# Patient Record
Sex: MALE | Race: BLACK OR AFRICAN AMERICAN
[De-identification: names, ages, dates, MRNs, and addresses within clinical notes are randomized per-mention and may not be internally consistent; named-entity substitution may affect disease eponyms.]

---

## 2024-09-19 ENCOUNTER — APPOINTMENT (OUTPATIENT)
Dept: OTOLARYNGOLOGY | Facility: CLINIC | Age: 59
End: 2024-09-19
Payer: COMMERCIAL

## 2024-09-19 ENCOUNTER — APPOINTMENT (OUTPATIENT)
Dept: OTOLARYNGOLOGY | Facility: CLINIC | Age: 59
End: 2024-09-19

## 2024-09-19 VITALS — HEIGHT: 70 IN | BODY MASS INDEX: 43.52 KG/M2 | WEIGHT: 304 LBS

## 2024-09-19 DIAGNOSIS — G47.33 OBSTRUCTIVE SLEEP APNEA (ADULT) (PEDIATRIC): ICD-10-CM

## 2024-09-19 DIAGNOSIS — H65.90 UNSPECIFIED NONSUPPURATIVE OTITIS MEDIA, UNSPECIFIED EAR: ICD-10-CM

## 2024-09-19 DIAGNOSIS — Z87.891 PERSONAL HISTORY OF NICOTINE DEPENDENCE: ICD-10-CM

## 2024-09-19 DIAGNOSIS — R05.9 COUGH, UNSPECIFIED: ICD-10-CM

## 2024-09-19 DIAGNOSIS — R05.3 CHRONIC COUGH: ICD-10-CM

## 2024-09-19 PROBLEM — Z00.00 ENCOUNTER FOR PREVENTIVE HEALTH EXAMINATION: Status: ACTIVE | Noted: 2024-09-19

## 2024-09-19 PROCEDURE — 92557 COMPREHENSIVE HEARING TEST: CPT

## 2024-09-19 PROCEDURE — 99204 OFFICE O/P NEW MOD 45 MIN: CPT | Mod: 25

## 2024-09-19 PROCEDURE — 92567 TYMPANOMETRY: CPT

## 2024-09-19 PROCEDURE — 31231 NASAL ENDOSCOPY DX: CPT

## 2024-09-19 RX ORDER — FLUTICASONE PROPIONATE 50 UG/1
50 SPRAY, METERED NASAL TWICE DAILY
Qty: 1 | Refills: 3 | Status: ACTIVE | COMMUNITY
Start: 2024-09-19 | End: 1900-01-01

## 2024-09-19 NOTE — ASSESSMENT
[FreeTextEntry1] : 59M w multiple ENT concerns.  He c/o cough for >1 yr. Pulmonology evaluation is normal, including PFTs, CXR and CT chest. His cough has improved within the past few months and now only occurs when he is talking for long periods of time. No hx of GERD, but diet is terrible. There is no nasal congestion/obstruction, no postnasal drip and no rhinorrhea. He also reports recurrent left sided nosebleeds. They have persisted for >20yrs, occurring every few weeks - they are always left sided and from the front of his nose and stop several minutes later. Of note, was diagnosed with GUALBERTO. He has gained 50 lbs over the past 4 years or so, He was offered CPAP but declined.  Audiogram from today shows a mostly a mild right sided mixed hearing loss sloping to moderate severe at HF as above. On exam, pt is overweight with a thick neck. There is a right serous effusion and retracted TM that autoinsufflates. Rhinoscopy shows a discrete, small raised left anterior septal lesion w overlying crust/scab and surrounding erythema. The rest of the head and neck exam is unremarkable. Several issues at play - he is overweight and has gained 50lbs in past 4 years or so; this is impacting his sleep dramatically. Further, his diet is terrible and there is likely some degree of LPR. Recommend exercise, diet and weight loss as next steps. His let sided epistaxis is due to the left anterior septal lesion - offered cautery but he declines for now. Lastly, he has a left serous effusion w some degree of conductive hearing loss - recommend nasal steroid sprays. RTO 6 weeks - if effusion/CHL persists, may need PE tube.

## 2024-09-19 NOTE — PHYSICAL EXAM
[Midline] : trachea located in midline position [Normal] : no rashes [FreeTextEntry1] : obese [de-identified] : thick [de-identified] : L EAC impacted allison molina, cleaned allison matt. R EAC clear [de-identified] : R sided serous effusion

## 2024-09-19 NOTE — HISTORY OF PRESENT ILLNESS
[de-identified] : 59M here for initial evaluation.  Pt here for multiple ENT concerns.   He c/o cough for >1 yr. It has improved during this time. Pulmonology evaluation is normal, including PFTs, CXR and CT chest. His cough has improved within the past few months and now only occurs when he is talking for long periods of time. No hx of GERD, but diet is terrible. There is no nasal congestion/obstruction, no postnasal drip and no rhinorrhea. He also reports recurrent left sided nosebleeds. They have persisted for >20yrs, occurring every few weeks - they are always left sided and from the front of his nose and stop several minutes later. Of note, was diagnosed with GUALBERTO. He has gained 50 lbs over the past 4 years or so, He was offered CPAP but declined.   Audiogram from today: R: mostly a mild mixed hearing loss sloping to moderate severe at HF. SRT 35, 100%, type C L: normal hearing sloping to mild HF SNHL. SRT 20, 100%, type A  ROS otherwise unremarkable.

## 2024-09-19 NOTE — PROCEDURE
[FreeTextEntry3] : Rhinoscopy: nasal airways patent discrete, small raised left anterior septal lesion w overlying crust/scab and surrounding erythema

## 2024-10-31 ENCOUNTER — NON-APPOINTMENT (OUTPATIENT)
Age: 59
End: 2024-10-31

## 2024-10-31 ENCOUNTER — APPOINTMENT (OUTPATIENT)
Dept: OTOLARYNGOLOGY | Facility: CLINIC | Age: 59
End: 2024-10-31
Payer: COMMERCIAL

## 2024-10-31 VITALS — HEIGHT: 70 IN | BODY MASS INDEX: 42.95 KG/M2 | WEIGHT: 300 LBS

## 2024-10-31 DIAGNOSIS — H65.90 UNSPECIFIED NONSUPPURATIVE OTITIS MEDIA, UNSPECIFIED EAR: ICD-10-CM

## 2024-10-31 DIAGNOSIS — R04.0 EPISTAXIS: ICD-10-CM

## 2024-10-31 PROCEDURE — 99214 OFFICE O/P EST MOD 30 MIN: CPT

## 2025-03-24 ENCOUNTER — RX RENEWAL (OUTPATIENT)
Age: 60
End: 2025-03-24